# Patient Record
Sex: MALE | Race: WHITE | NOT HISPANIC OR LATINO | Employment: FULL TIME | URBAN - METROPOLITAN AREA
[De-identification: names, ages, dates, MRNs, and addresses within clinical notes are randomized per-mention and may not be internally consistent; named-entity substitution may affect disease eponyms.]

---

## 2020-05-13 ENCOUNTER — OFFICE VISIT (OUTPATIENT)
Dept: PODIATRY | Facility: CLINIC | Age: 25
End: 2020-05-13

## 2020-05-13 VITALS
HEART RATE: 76 BPM | BODY MASS INDEX: 26.77 KG/M2 | HEIGHT: 70 IN | WEIGHT: 187 LBS | TEMPERATURE: 98 F | DIASTOLIC BLOOD PRESSURE: 78 MMHG | SYSTOLIC BLOOD PRESSURE: 131 MMHG

## 2020-05-13 DIAGNOSIS — M79.674 TOE PAIN, RIGHT: ICD-10-CM

## 2020-05-13 DIAGNOSIS — L03.031 CELLULITIS OF TOE OF RIGHT FOOT: ICD-10-CM

## 2020-05-13 DIAGNOSIS — L60.0 INGROWN TOENAIL OF RIGHT FOOT: Primary | ICD-10-CM

## 2020-05-13 PROCEDURE — 11750 NAIL REMOVAL: ICD-10-PCS | Mod: S$PBB,T5,, | Performed by: PODIATRIST

## 2020-05-13 PROCEDURE — 99204 OFFICE O/P NEW MOD 45 MIN: CPT | Mod: 25 | Performed by: PODIATRIST

## 2020-05-13 PROCEDURE — 99202 PR OFFICE/OUTPT VISIT, NEW, LEVL II, 15-29 MIN: ICD-10-PCS | Mod: S$PBB,25,, | Performed by: PODIATRIST

## 2020-05-13 PROCEDURE — 99202 OFFICE O/P NEW SF 15 MIN: CPT | Mod: S$PBB,25,, | Performed by: PODIATRIST

## 2020-05-13 PROCEDURE — 11750 EXCISION NAIL&NAIL MATRIX: CPT | Mod: PBBFAC | Performed by: PODIATRIST

## 2020-05-13 RX ORDER — CEPHALEXIN 500 MG/1
500 CAPSULE ORAL EVERY 12 HOURS
Qty: 14 CAPSULE | Refills: 0 | Status: SHIPPED | OUTPATIENT
Start: 2020-05-13 | End: 2020-05-20

## 2020-05-13 NOTE — PATIENT INSTRUCTIONS

## 2020-05-13 NOTE — PROGRESS NOTES
[address] [facility] [address] [place] [address]  Phone: [phone]   [phone]    Patient's PCP:ANJEL Almendarez  Referring Provider: No ref. provider found    Subjective:      Chief Complaint:: Toe Pain (right 1st lateral)    KASHIF Torres is a 25 y.o. male who presents with a complaint of right 1st toenail pain lasting for about 1 year. Onset of the symptoms was pt dropped an air furnace on right 1st toe about 1 year ago, toenail fell off and didn't quite grow back right.  Current symptoms include at end of day feels throbby.  Aggravating factors are pressure. Symptoms have stayed the same for about 6 months. Treatment to date have included triple antibiotic ointment and bandaid 2x daily, went to Urgent care who Rx antibiotic and redness came down, has been picking at it. Patients rates pain 4/10 on pain scale.    Vitals:    05/13/20 1431   BP: 131/78   Pulse: 76   Temp: 98.3 °F (36.8 °C)     Shoe Size:     History reviewed. No pertinent surgical history.  History reviewed. No pertinent past medical history.  History reviewed. No pertinent family history.     Social History:   Marital Status:   Alcohol History:  has no alcohol history on file.  Tobacco History:  reports that he has never smoked. He does not have any smokeless tobacco history on file.  Drug History:  has no drug history on file.    Review of patient's allergies indicates:  No Known Allergies    Current Outpatient Medications   Medication Sig Dispense Refill    cephALEXin (KEFLEX) 500 MG capsule Take 1 capsule (500 mg total) by mouth every 12 (twelve) hours. for 7 days 14 capsule 0     No current facility-administered medications for this visit.        Review of Systems      Objective:        Physical Exam:   Foot Exam    General  General Appearance: appears stated age and healthy   Orientation: alert and oriented to person, place, and time   Affect: appropriate       Right Foot/Ankle     Inspection and  Palpation  Ecchymosis: none  Tenderness: (Lateral border great toenail)  Swelling: (Lateral border great toenail)  Arch: normal  Skin Exam: drainage and erythema;     Neurovascular  Dorsalis pedis: 2+  Posterior tibial: 2+  Saphenous nerve sensation: normal  Tibial nerve sensation: normal  Superficial peroneal nerve sensation: normal  Deep peroneal nerve sensation: normal  Sural nerve sensation: normal    Muscle Strength  Ankle dorsiflexion: 5  Ankle plantar flexion: 5  Ankle inversion: 5  Ankle eversion: 5  Great toe extension: 5  Great toe flexion: 5    Range of Motion    Normal right ankle ROM    Comments  Ingrown lateral border great toenail.  Erythema and edema are present.  A few drops of purulence are seen at the distal nail margin.  Paronychia with granuloma formation is present.        Physical Exam   Cardiovascular:   Pulses:       Dorsalis pedis pulses are 2+ on the right side.        Posterior tibial pulses are 2+ on the right side.   Feet:   Right Foot:   Skin Integrity: Positive for erythema.       Imaging: none            Assessment:       1. Ingrown toenail of right foot    2. Toe pain, right    3. Cellulitis of toe of right foot      Plan:   Ingrown toenail of right foot  -     cephALEXin (KEFLEX) 500 MG capsule; Take 1 capsule (500 mg total) by mouth every 12 (twelve) hours. for 7 days  Dispense: 14 capsule; Refill: 0  -     Nail Removal    Toe pain, right  -     cephALEXin (KEFLEX) 500 MG capsule; Take 1 capsule (500 mg total) by mouth every 12 (twelve) hours. for 7 days  Dispense: 14 capsule; Refill: 0  -     Nail Removal    Cellulitis of toe of right foot  -     cephALEXin (KEFLEX) 500 MG capsule; Take 1 capsule (500 mg total) by mouth every 12 (twelve) hours. for 7 days  Dispense: 14 capsule; Refill: 0      Follow up if symptoms worsen or fail to improve.    Nail Removal  Date/Time: 5/13/2020 2:20 PM  Performed by: Ganga Grande DPM  Authorized by: Ganga Grande DPM     Consent Done?:  Yes  (Written)    Location:  Right foot  Location detail:  Right big toe  Anesthesia:  Local infiltration  Local anesthetic: lidocaine 2% without epinephrine  Preparation:  Skin prepped with alcohol    Amount removed:  Partial  Wedge excision of skin of nail fold: No    Nail bed sutured?: No    Nail matrix removed:  Partial  Dressing applied:  4x4  Patient tolerance:  Patient tolerated the procedure well with no immediate complications     Lateral border         Counseling:     I provided patient education verbally regarding:   Patient diagnosis, treatment options, as well as alternatives, risks, and benefits.   Ingrown toenail treatment options of no treatment, avulsion of nail border under local with regrowth of nail, chemical matrixectomy for attempted permanent correction of the problem. Patient was educated about daily dressing changes, soaks, and medications following removal of the nail.       This note was created using Dragon voice recognition software that occasionally misinterpreted phrases or words.